# Patient Record
Sex: MALE | URBAN - METROPOLITAN AREA
[De-identification: names, ages, dates, MRNs, and addresses within clinical notes are randomized per-mention and may not be internally consistent; named-entity substitution may affect disease eponyms.]

---

## 2024-03-25 ENCOUNTER — OV NP (OUTPATIENT)
Dept: URBAN - METROPOLITAN AREA CLINIC 33 | Facility: CLINIC | Age: 44
End: 2024-03-25
Payer: COMMERCIAL

## 2024-03-25 VITALS
SYSTOLIC BLOOD PRESSURE: 165 MMHG | WEIGHT: 177 LBS | BODY MASS INDEX: 26.22 KG/M2 | DIASTOLIC BLOOD PRESSURE: 110 MMHG | HEIGHT: 69 IN | OXYGEN SATURATION: 99 % | HEART RATE: 92 BPM

## 2024-03-25 DIAGNOSIS — K59.00 CONSTIPATION, UNSPECIFIED CONSTIPATION TYPE: ICD-10-CM

## 2024-03-25 DIAGNOSIS — R11.0 NAUSEA: ICD-10-CM

## 2024-03-25 DIAGNOSIS — R10.84 GENERALIZED ABDOMINAL PAIN: ICD-10-CM

## 2024-03-25 DIAGNOSIS — R14.0 ABDOMINAL BLOATING: ICD-10-CM

## 2024-03-25 PROBLEM — 14760008: Status: ACTIVE | Noted: 2024-03-25

## 2024-03-25 PROBLEM — 102614006: Status: ACTIVE | Noted: 2024-03-25

## 2024-03-25 PROBLEM — 422587007: Status: ACTIVE | Noted: 2024-03-25

## 2024-03-25 PROBLEM — 116289008: Status: ACTIVE | Noted: 2024-03-25

## 2024-03-25 PROCEDURE — 99204 OFFICE O/P NEW MOD 45 MIN: CPT | Performed by: INTERNAL MEDICINE

## 2024-03-25 RX ORDER — DICYCLOMINE HYDROCHLORIDE 20 MG/1
1 TABLET TABLET ORAL THREE TIMES A DAY
Qty: 45 TABLET | Refills: 0 | OUTPATIENT
Start: 2024-03-25 | End: 2024-04-09

## 2024-03-25 RX ORDER — ALLOPURINOL 100 MG/1
1 TABLET TABLET ORAL ONCE A DAY
Status: ACTIVE | COMMUNITY

## 2024-03-25 RX ORDER — ONDANSETRON HYDROCHLORIDE 8 MG/1
1 TABLET AS NEEDED TABLET, FILM COATED ORAL ONCE A DAY
Qty: 30 | OUTPATIENT
Start: 2024-03-25

## 2024-03-25 RX ORDER — METOPROLOL TARTRATE 50 MG/1
1 TABLET WITH FOOD TABLET, FILM COATED ORAL TWICE A DAY
Status: ACTIVE | COMMUNITY

## 2024-03-25 RX ORDER — OLMESARTAN MEDOXOMIL 20 MG/1
1 TABLET TABLET, FILM COATED ORAL ONCE A DAY
Status: ACTIVE | COMMUNITY

## 2024-03-25 NOTE — HPI-ABDOMINAL PAIN
43 year old male patient presents for abdominal pain consult. Patient admits  he has been experiencing symptoms since 3 months ago. Patient describes symptoms as on and off discumfort. Patient states symptoms are located in middle abdomen. Patient admits symptoms are more present after meals. Patient admits nausea or vomiting. Patient states he visited urgent care and completed X-ray.  Of note: Patient has a history of Hemorrhoids.   Patient admits having any recent imaging. X-ray: Patient denies EGD in the past.  Most recent labs completed resulted below 02/15/2024. CBC: Normal CMP: Glucose  - 109 High, Chloride  - 89  Low, All other values are within normal limits.  Hemoglobin A1  - 8.1 High Vitamin D, 25-Hydroxy  - 17.6 Low  Lipid Panel:  Cholesterol Total - 239 High Triglycerides  -221 High  LDL Chol Calc  - 139 High  All other values are within normal limits.

## 2024-03-25 NOTE — HPI-ACID REFLUX
Patient presents for heartburn consult. Patient states he has been experiencing symptoms since 2 months ago. Patient admits currently taking tums OTC for relief of symptoms. Patient describes symptoms as discumfort  and states they are most present during or after meals. Patient denies nighttime symptoms. Patient admits nausea or vomiting. Patient denies any associated weight loss. Patient denies EGD in the past.
23-Nov-2017 19:32

## 2024-06-03 ENCOUNTER — OFFICE VISIT (OUTPATIENT)
Dept: URBAN - METROPOLITAN AREA CLINIC 33 | Facility: CLINIC | Age: 44
End: 2024-06-03